# Patient Record
Sex: FEMALE | Race: WHITE | HISPANIC OR LATINO | Employment: FULL TIME | URBAN - METROPOLITAN AREA
[De-identification: names, ages, dates, MRNs, and addresses within clinical notes are randomized per-mention and may not be internally consistent; named-entity substitution may affect disease eponyms.]

---

## 2017-08-11 ENCOUNTER — HOSPITAL ENCOUNTER (OUTPATIENT)
Dept: RADIOLOGY | Facility: HOSPITAL | Age: 47
Discharge: HOME | End: 2017-08-11
Attending: ORTHOPAEDIC SURGERY
Payer: COMMERCIAL

## 2017-08-11 ENCOUNTER — ALLSCRIPTS OFFICE VISIT (OUTPATIENT)
Dept: OTHER | Facility: OTHER | Age: 47
End: 2017-08-11

## 2017-08-11 DIAGNOSIS — M25.562 PAIN IN LEFT KNEE: ICD-10-CM

## 2017-08-11 DIAGNOSIS — M25.561 PAIN IN RIGHT KNEE: ICD-10-CM

## 2017-08-11 DIAGNOSIS — M17.11 PRIMARY OSTEOARTHRITIS OF RIGHT KNEE: ICD-10-CM

## 2017-08-11 DIAGNOSIS — M17.12 PRIMARY OSTEOARTHRITIS OF LEFT KNEE: ICD-10-CM

## 2017-08-11 PROCEDURE — 73562 X-RAY EXAM OF KNEE 3: CPT

## 2017-08-15 ENCOUNTER — APPOINTMENT (OUTPATIENT)
Dept: PHYSICAL THERAPY | Facility: REHABILITATION | Age: 47
End: 2017-08-15
Payer: COMMERCIAL

## 2017-08-15 DIAGNOSIS — M17.11 PRIMARY OSTEOARTHRITIS OF RIGHT KNEE: ICD-10-CM

## 2017-08-15 DIAGNOSIS — M17.12 PRIMARY OSTEOARTHRITIS OF LEFT KNEE: ICD-10-CM

## 2017-08-15 PROCEDURE — 97162 PT EVAL MOD COMPLEX 30 MIN: CPT

## 2017-08-15 PROCEDURE — 97110 THERAPEUTIC EXERCISES: CPT

## 2017-08-15 PROCEDURE — 97140 MANUAL THERAPY 1/> REGIONS: CPT

## 2017-08-17 ENCOUNTER — APPOINTMENT (OUTPATIENT)
Dept: PHYSICAL THERAPY | Facility: REHABILITATION | Age: 47
End: 2017-08-17
Payer: COMMERCIAL

## 2017-08-17 PROCEDURE — 97110 THERAPEUTIC EXERCISES: CPT

## 2017-08-17 PROCEDURE — 97112 NEUROMUSCULAR REEDUCATION: CPT

## 2017-08-17 PROCEDURE — 97140 MANUAL THERAPY 1/> REGIONS: CPT

## 2017-08-22 ENCOUNTER — APPOINTMENT (OUTPATIENT)
Dept: PHYSICAL THERAPY | Facility: REHABILITATION | Age: 47
End: 2017-08-22
Payer: COMMERCIAL

## 2017-08-22 PROCEDURE — 97110 THERAPEUTIC EXERCISES: CPT

## 2017-08-22 PROCEDURE — 97112 NEUROMUSCULAR REEDUCATION: CPT

## 2017-08-22 PROCEDURE — 97140 MANUAL THERAPY 1/> REGIONS: CPT

## 2017-08-24 ENCOUNTER — APPOINTMENT (OUTPATIENT)
Dept: PHYSICAL THERAPY | Facility: REHABILITATION | Age: 47
End: 2017-08-24
Payer: COMMERCIAL

## 2017-08-24 PROCEDURE — 97110 THERAPEUTIC EXERCISES: CPT

## 2017-08-24 PROCEDURE — 97112 NEUROMUSCULAR REEDUCATION: CPT

## 2017-08-24 PROCEDURE — 97140 MANUAL THERAPY 1/> REGIONS: CPT

## 2017-08-29 ENCOUNTER — APPOINTMENT (OUTPATIENT)
Dept: PHYSICAL THERAPY | Facility: REHABILITATION | Age: 47
End: 2017-08-29
Payer: COMMERCIAL

## 2017-08-29 PROCEDURE — 97110 THERAPEUTIC EXERCISES: CPT

## 2017-08-29 PROCEDURE — 97112 NEUROMUSCULAR REEDUCATION: CPT

## 2017-08-29 PROCEDURE — 97140 MANUAL THERAPY 1/> REGIONS: CPT

## 2017-08-30 ENCOUNTER — GENERIC CONVERSION - ENCOUNTER (OUTPATIENT)
Dept: OTHER | Facility: OTHER | Age: 47
End: 2017-08-30

## 2017-08-31 ENCOUNTER — APPOINTMENT (OUTPATIENT)
Dept: PHYSICAL THERAPY | Facility: REHABILITATION | Age: 47
End: 2017-08-31
Payer: COMMERCIAL

## 2017-08-31 PROCEDURE — 97110 THERAPEUTIC EXERCISES: CPT

## 2017-08-31 PROCEDURE — 97140 MANUAL THERAPY 1/> REGIONS: CPT

## 2017-09-05 ENCOUNTER — APPOINTMENT (OUTPATIENT)
Dept: PHYSICAL THERAPY | Facility: REHABILITATION | Age: 47
End: 2017-09-05
Payer: COMMERCIAL

## 2017-09-07 ENCOUNTER — APPOINTMENT (OUTPATIENT)
Dept: PHYSICAL THERAPY | Facility: REHABILITATION | Age: 47
End: 2017-09-07
Payer: COMMERCIAL

## 2017-09-12 ENCOUNTER — APPOINTMENT (OUTPATIENT)
Dept: PHYSICAL THERAPY | Facility: REHABILITATION | Age: 47
End: 2017-09-12
Payer: COMMERCIAL

## 2017-09-12 PROCEDURE — 97110 THERAPEUTIC EXERCISES: CPT

## 2017-09-12 PROCEDURE — 97112 NEUROMUSCULAR REEDUCATION: CPT

## 2017-09-12 PROCEDURE — 97140 MANUAL THERAPY 1/> REGIONS: CPT

## 2017-09-14 ENCOUNTER — APPOINTMENT (OUTPATIENT)
Dept: PHYSICAL THERAPY | Facility: REHABILITATION | Age: 47
End: 2017-09-14
Payer: COMMERCIAL

## 2017-09-14 PROCEDURE — 97110 THERAPEUTIC EXERCISES: CPT

## 2017-09-14 PROCEDURE — 97112 NEUROMUSCULAR REEDUCATION: CPT

## 2017-09-14 PROCEDURE — 97140 MANUAL THERAPY 1/> REGIONS: CPT

## 2017-09-19 ENCOUNTER — APPOINTMENT (OUTPATIENT)
Dept: PHYSICAL THERAPY | Facility: REHABILITATION | Age: 47
End: 2017-09-19
Payer: COMMERCIAL

## 2017-09-19 PROCEDURE — 97140 MANUAL THERAPY 1/> REGIONS: CPT

## 2017-09-19 PROCEDURE — 97110 THERAPEUTIC EXERCISES: CPT

## 2017-09-19 PROCEDURE — 97112 NEUROMUSCULAR REEDUCATION: CPT

## 2017-09-21 ENCOUNTER — APPOINTMENT (OUTPATIENT)
Dept: PHYSICAL THERAPY | Facility: REHABILITATION | Age: 47
End: 2017-09-21
Payer: COMMERCIAL

## 2017-09-21 PROCEDURE — 97112 NEUROMUSCULAR REEDUCATION: CPT

## 2017-09-21 PROCEDURE — 97110 THERAPEUTIC EXERCISES: CPT

## 2017-09-26 ENCOUNTER — APPOINTMENT (OUTPATIENT)
Dept: PHYSICAL THERAPY | Facility: REHABILITATION | Age: 47
End: 2017-09-26
Payer: COMMERCIAL

## 2017-09-26 PROCEDURE — 97140 MANUAL THERAPY 1/> REGIONS: CPT

## 2017-09-26 PROCEDURE — 97110 THERAPEUTIC EXERCISES: CPT

## 2017-09-26 PROCEDURE — 97112 NEUROMUSCULAR REEDUCATION: CPT

## 2017-09-28 ENCOUNTER — APPOINTMENT (OUTPATIENT)
Dept: PHYSICAL THERAPY | Facility: REHABILITATION | Age: 47
End: 2017-09-28
Payer: COMMERCIAL

## 2017-09-28 PROCEDURE — 97112 NEUROMUSCULAR REEDUCATION: CPT

## 2017-09-28 PROCEDURE — 97140 MANUAL THERAPY 1/> REGIONS: CPT

## 2017-09-28 PROCEDURE — 97110 THERAPEUTIC EXERCISES: CPT

## 2017-10-03 ENCOUNTER — APPOINTMENT (OUTPATIENT)
Dept: PHYSICAL THERAPY | Facility: REHABILITATION | Age: 47
End: 2017-10-03
Payer: COMMERCIAL

## 2017-10-05 ENCOUNTER — APPOINTMENT (OUTPATIENT)
Dept: PHYSICAL THERAPY | Facility: REHABILITATION | Age: 47
End: 2017-10-05
Payer: COMMERCIAL

## 2017-10-10 ENCOUNTER — APPOINTMENT (OUTPATIENT)
Dept: PHYSICAL THERAPY | Facility: REHABILITATION | Age: 47
End: 2017-10-10
Payer: COMMERCIAL

## 2017-10-16 ENCOUNTER — APPOINTMENT (OUTPATIENT)
Dept: PHYSICAL THERAPY | Facility: REHABILITATION | Age: 47
End: 2017-10-16
Payer: COMMERCIAL

## 2017-10-16 PROCEDURE — 97110 THERAPEUTIC EXERCISES: CPT

## 2017-10-16 PROCEDURE — 97112 NEUROMUSCULAR REEDUCATION: CPT

## 2017-10-17 ENCOUNTER — APPOINTMENT (OUTPATIENT)
Dept: PHYSICAL THERAPY | Facility: REHABILITATION | Age: 47
End: 2017-10-17
Payer: COMMERCIAL

## 2017-10-17 PROCEDURE — 97110 THERAPEUTIC EXERCISES: CPT

## 2017-10-17 PROCEDURE — 97112 NEUROMUSCULAR REEDUCATION: CPT

## 2017-10-26 ENCOUNTER — APPOINTMENT (OUTPATIENT)
Dept: PHYSICAL THERAPY | Facility: REHABILITATION | Age: 47
End: 2017-10-26
Payer: COMMERCIAL

## 2017-10-31 ENCOUNTER — APPOINTMENT (OUTPATIENT)
Dept: PHYSICAL THERAPY | Facility: REHABILITATION | Age: 47
End: 2017-10-31
Payer: COMMERCIAL

## 2017-11-30 ENCOUNTER — GENERIC CONVERSION - ENCOUNTER (OUTPATIENT)
Dept: OBGYN CLINIC | Facility: CLINIC | Age: 47
End: 2017-11-30

## 2018-01-14 VITALS — HEART RATE: 65 BPM | DIASTOLIC BLOOD PRESSURE: 72 MMHG | SYSTOLIC BLOOD PRESSURE: 105 MMHG | WEIGHT: 165 LBS

## 2022-03-25 ENCOUNTER — TELEPHONE (OUTPATIENT)
Dept: OTHER | Facility: OTHER | Age: 52
End: 2022-03-25

## 2022-03-25 NOTE — TELEPHONE ENCOUNTER
Rosales Adames (Self) [phone] (M)    Is calling to cancel her 1000 appointment and is requesting a call back to reschedule

## 2023-10-13 ENCOUNTER — TELEPHONE (OUTPATIENT)
Dept: NEUROLOGY | Facility: CLINIC | Age: 53
End: 2023-10-13

## 2023-10-13 NOTE — TELEPHONE ENCOUNTER
Lmom for patient to call me back to confirm her appointment on 10/20/23 9:00 am in our [facility] location AMS, unable to accurately obtain   + FEVER, AMS  + [email or web address]

## 2023-10-18 ENCOUNTER — TELEPHONE (OUTPATIENT)
Dept: NEUROLOGY | Facility: CLINIC | Age: 53
End: 2023-10-18